# Patient Record
Sex: FEMALE | Race: BLACK OR AFRICAN AMERICAN | Employment: FULL TIME | URBAN - METROPOLITAN AREA
[De-identification: names, ages, dates, MRNs, and addresses within clinical notes are randomized per-mention and may not be internally consistent; named-entity substitution may affect disease eponyms.]

---

## 2020-07-10 ENCOUNTER — OFFICE VISIT (OUTPATIENT)
Dept: PRIMARY CARE CLINIC | Age: 60
End: 2020-07-10

## 2020-07-10 PROCEDURE — 99211 OFF/OP EST MAY X REQ PHY/QHP: CPT | Performed by: FAMILY MEDICINE

## 2020-07-10 NOTE — PROGRESS NOTES
Gary Garcia received a viral test for COVID-19. They were educated on isolation and quarantine as appropriate. For any symptoms, they were directed to seek care from their PCP, given contact information to establish with a doctor, directed to an urgent care or the emergency room.

## 2020-07-10 NOTE — PATIENT INSTRUCTIONS
You have received a viral test for COVID-19. Below is education on quarantine per the CDC guidelines. For any symptoms, seek care from your PCP, call [phone] to establish care with a doctor, or go directly to an urgent care or the emergency room. Test results will take 2-7 days and will be sent to you in your [employer] account. If you test positive, you will be contacted via phone. If you test negative, the ONLY communication will be through [address] GO TO [place] AND SIGN UP FOR [employer]   (LOWER LEFT OF THE HOME PAGE)   No test is 100%. If you have symptoms, you should follow the guidance of quarantine as previously stated. You can still be contagious if you have symptoms. Your [facility] will reach out to you if you have a positive result. They will provide you with a return to work date and note. If you were tested for a pre-op, then you should remain in quarantine until your procedure. How do I know if I need to be in quarantine? If you live in a community where COVID-19 is or might be spreading (currently, that is virtually everywhere in the [place])   Be alert for symptoms. Watch for fever, cough, shortness of breath, or other symptoms of COVID-19. · Take your temperature if symptoms develop. · Practice social distancing. Maintain 6 feet of distance from others and stay out of crowded places. · Follow CDC guidance if symptoms develop. If you feel healthy but: · Recently had close contact with a person with COVID-19 you need to Quarantine:   · Stay home until 14 days after your last exposure. · Check your temperature twice a day and watch for symptoms of COVID-19. · If possible, stay away from people who are at higher-risk for getting very sick from COVID-19.    Stay Home and Monitor Your Health if you:   · Have been diagnosed with COVID-19, or   · Are waiting for test results, or   · Have cough, fever, or shortness of breath, or symptoms of COVID-19   When You Can be Around Others After You Had or Likely Had COVID-19   If you have or think you might have COVID-19, it is important to stay home and away from other people. Staying away from others helps stop the spread of COVID-19. If you have an emergency warning sign (including trouble breathing), get emergency medical care immediately. When you can be around others (end home isolation) depends on different factors for different situations. Find CDC's recommendations for your situation below. I think or know I had COVID-19, and I had symptoms   You can be with others after   · 3 days with no fever and   · Respiratory symptoms have improved (e.g. cough, shortness of breath) and   · 10 days since symptoms first appeared   Depending on your healthcare provider's advice and availability of testing, you might get tested to see if you still have COVID-19. If you will be tested, you can be around others when you have no fever, respiratory symptoms have improved, and you receive two negative test results in a row, at least 24 hours apart. I tested positive for COVID-19 but had no symptoms   If you continue to have no symptoms, you can be with others after:   · 10 days have passed since test or 14 days since your exposure test   Depending on your healthcare provider's advice and availability of testing, you might get tested to see if you still have COVID-19. If you will be tested, you can be around others after you receive two negative test results in a row, at least 24 hours apart. If you develop symptoms after testing positive, follow the guidance above for I think or know I had COVID, and I had symptoms.    For Anyone Who Has Been Around a Person with COVID-19   It is important to remember that anyone who has close contact with someone with COVID-19 should stay home for 14 days after exposure based on the time it takes to develop illness. Testing is not necessary.    www.cdc.gov/coronavirus/2019-ncov/index.html

## 2020-07-15 LAB
SARS-COV-2: NOT DETECTED
SOURCE: NORMAL

## 2020-07-22 ENCOUNTER — OFFICE VISIT (OUTPATIENT)
Dept: ORTHOPEDIC SURGERY | Age: 60
End: 2020-07-22

## 2020-07-22 VITALS — HEIGHT: 67 IN | BODY MASS INDEX: 34.53 KG/M2 | WEIGHT: 220 LBS

## 2020-07-22 PROCEDURE — 99024 POSTOP FOLLOW-UP VISIT: CPT | Performed by: ORTHOPAEDIC SURGERY

## 2020-07-22 RX ORDER — LOSARTAN POTASSIUM 25 MG/1
25 TABLET ORAL DAILY
COMMUNITY

## 2020-07-22 NOTE — PROGRESS NOTES
Chief Complaint    Foot Pain (Left foot- heel. Right foot- lateral aspect of foot)      History of Present Illness:  Steven Goddard is a 64 y.o. female for evaluation of chief complaint plantar foot calluses. This started approximately December when she noticed the. Symptoms are worse with walking barefoot and better with padded shoes. Patient endorses plantar skin lesion but denies any kind of group shower. Treatment to date includes: None      Medical History:  Patient's medications, allergies, past medical, surgical, social and family histories were reviewed and updated as appropriate. Review of Systems:  Relevant review of systems reviewed and available in the patient's chart. They are negative or noncontributory except as per HPI. Vital Signs: There were no vitals filed for this visit. General: well-developed, well-nourished  CV: RR  RESP: Respirations unlabored on RA  Skin: no rashes or ulcerations  Psych: appropriate mood and affect  Musculoskeletal:    Extremity: Bilateral    Inspection: On the right foot she has 2 keratinized type plugs that are tender about the lateral border of her forefoot. On the left foot she has a larger one approximately half a centimeter on the anterior medial calcaneal heel pad    Palpation: These are firm and tender to palpation but are superficial    Range of Motion: Full ankle range    Stability: Ankle stable    Strength: 5 out of 5 throughout    Special Tests: None    Gait: Normal    Pulse/perfusion: Plus chest pedis pulse foot warm up    Neurological:    Sensation: Intact to light touch throughout bilateral feet    Radiology:     No new imaging obtained today    Assessment : 81-year-old female with bilateral foot keratinized lesions    Impression:  Encounter Diagnosis   Name Primary?     Plantar wart Yes       Office Procedures:  Orders Placed This Encounter   Procedures   [address] - [facility]     Referral Priority:   Routine Referral Type:   Eval and Treat     Referral Reason:   Specialty Services Required     Referred to Provider:   Luis E Pickering DPM     Requested Specialty:   Podiatry     Number of Visits Requested:   1       Treatment Plan:      Quite frankly I am not entirely sure what these are. They may be plantar warts or something else. To that end I would like her to see a dermatologist.  I did recommend that she could try putting duct tape on these in the case of they are plantar warts as that can be an effective treatment. Also give referral to Dr. Daniel King as he certainly has more expertise in this area than I. She is welcome to call me if she has any issues and I am happy to see her back and try to make further referrals. As is I unfortunately did not really add anything to her care today and I believe that she would have better been referred elsewhere I will no charge this visit.

## 2020-08-06 ENCOUNTER — OFFICE VISIT (OUTPATIENT)
Dept: ORTHOPEDIC SURGERY | Age: 60
End: 2020-08-06
Payer: COMMERCIAL

## 2020-08-06 VITALS — BODY MASS INDEX: 34.07 KG/M2 | TEMPERATURE: 97.7 F | WEIGHT: 212 LBS | HEIGHT: 66 IN

## 2020-08-06 PROCEDURE — 99202 OFFICE O/P NEW SF 15 MIN: CPT | Performed by: PODIATRIST

## 2020-08-06 NOTE — PROGRESS NOTES
HISTORY OF PRESENT ILLNESS: This is an initial visit for a 73-year-old female patient who has a chief complaint of a painful spot on the bottom of the right and left foot. This has been present for several years. Occasional use of OTC topical medications has been used without any success. FAMILY HISTORY: Documented in chart. SOCIAL HISTORY: Documented in chart. REVIEW OF SYSTEMS: The patient denies any problems with cardiovascular, pulmonary, gastrointestinal, neurologic, urologic, genitourinary, psychiatric, dermatologic, and HEENT systems. PHYSICAL EXAM:  There is a verrucous lesion at the right 5th MTP which measures approximately 2mm in diameter. She also has one on the plantar medial aspect of the left heel which measures 5mm in diameter. This is painful to palpation. No other lesions are noted on either foot. The pedal pulses are palpable and the sensation is grossly intact, bilateral.      ASSESSMENT: Verruca plantaris      PLAN:  I educated the patient on the pathology and its treatment options. She elects to defer the excision for now and would like to try regular debridement of the callus to stay comfortable. We discussed different methods on how to do that. I will see her back as needed.

## 2023-08-30 ENCOUNTER — HOSPITAL ENCOUNTER (OUTPATIENT)
Dept: MAMMOGRAPHY | Age: 63
Discharge: HOME | End: 2023-08-30
Payer: COMMERCIAL

## 2023-08-30 VITALS — HEIGHT: 66 IN | WEIGHT: 213 LBS | BODY MASS INDEX: 34.23 KG/M2

## 2023-08-30 DIAGNOSIS — Z12.31 VISIT FOR SCREENING MAMMOGRAM: ICD-10-CM

## 2023-08-30 PROCEDURE — 77067 SCR MAMMO BI INCL CAD: CPT

## 2024-10-28 ENCOUNTER — HOSPITAL ENCOUNTER (OUTPATIENT)
Dept: GENERAL RADIOLOGY | Age: 64
Discharge: HOME | End: 2024-10-28
Payer: COMMERCIAL

## 2024-10-28 DIAGNOSIS — M25.562 ARTHRALGIA OF LEFT LOWER LEG: ICD-10-CM

## 2024-10-28 PROCEDURE — 73562 X-RAY EXAM OF KNEE 3: CPT

## 2024-11-06 ENCOUNTER — HOSPITAL ENCOUNTER (OUTPATIENT)
Dept: MAMMOGRAPHY | Age: 64
Discharge: HOME | End: 2024-11-06
Payer: COMMERCIAL

## 2024-11-06 VITALS — WEIGHT: 220 LBS | BODY MASS INDEX: 36.65 KG/M2 | HEIGHT: 65 IN

## 2024-11-06 DIAGNOSIS — Z12.31 VISIT FOR SCREENING MAMMOGRAM: ICD-10-CM

## 2024-11-06 PROCEDURE — 77063 BREAST TOMOSYNTHESIS BI: CPT
